# Patient Record
Sex: MALE | Race: WHITE | URBAN - METROPOLITAN AREA
[De-identification: names, ages, dates, MRNs, and addresses within clinical notes are randomized per-mention and may not be internally consistent; named-entity substitution may affect disease eponyms.]

---

## 2017-08-23 ENCOUNTER — OFFICE VISIT (OUTPATIENT)
Dept: INTERNAL MEDICINE CLINIC | Age: 62
End: 2017-08-23

## 2017-08-23 VITALS
WEIGHT: 198 LBS | HEIGHT: 69 IN | DIASTOLIC BLOOD PRESSURE: 80 MMHG | BODY MASS INDEX: 29.33 KG/M2 | RESPIRATION RATE: 16 BRPM | TEMPERATURE: 98.6 F | SYSTOLIC BLOOD PRESSURE: 124 MMHG | OXYGEN SATURATION: 96 % | HEART RATE: 74 BPM

## 2017-08-23 DIAGNOSIS — Z00.00 ROUTINE GENERAL MEDICAL EXAMINATION AT A HEALTH CARE FACILITY: Primary | ICD-10-CM

## 2017-08-23 DIAGNOSIS — Z11.59 NEED FOR HEPATITIS C SCREENING TEST: ICD-10-CM

## 2017-08-23 DIAGNOSIS — I10 ESSENTIAL HYPERTENSION WITH GOAL BLOOD PRESSURE LESS THAN 130/80: ICD-10-CM

## 2017-08-23 DIAGNOSIS — E78.2 MIXED HYPERLIPIDEMIA: ICD-10-CM

## 2017-08-23 RX ORDER — LOVASTATIN 20 MG/1
TABLET ORAL
Qty: 90 TAB | Refills: 4 | Status: SHIPPED | OUTPATIENT
Start: 2017-08-23 | End: 2018-11-29 | Stop reason: SDUPTHER

## 2017-08-23 RX ORDER — LISINOPRIL 10 MG/1
10 TABLET ORAL DAILY
Qty: 90 TAB | Refills: 4 | Status: SHIPPED | OUTPATIENT
Start: 2017-08-23 | End: 2018-11-29 | Stop reason: SDUPTHER

## 2017-08-23 RX ORDER — SILDENAFIL CITRATE 20 MG/1
20-100 TABLET ORAL
Qty: 90 TAB | Refills: 3 | Status: SHIPPED | OUTPATIENT
Start: 2017-08-23 | End: 2022-04-18 | Stop reason: ALTCHOICE

## 2017-08-23 NOTE — MR AVS SNAPSHOT
Visit Information Date & Time Provider Department Dept. Phone Encounter #  
 8/23/2017 10:30 AM Beatriz Torres MD [facility] Internal Medicine [phone] [id number] Follow-up Instructions Return in about 1 year (around 8/23/2018). Upcoming Health Maintenance Date Due Hepatitis C Screening 1955 INFLUENZA AGE 9 TO ADULT 8/1/2017 COLONOSCOPY 10/1/2018 DTaP/Tdap/Td series (2 - Td) 8/19/2026 Allergies as of 8/23/2017  Review Complete On: 8/23/2017 By: Beatriz Torres MD  
 No Known Allergies Current Immunizations  Reviewed on 8/23/2017 Name Date Influenza Vaccine 11/1/2013 Influenza Vaccine (Quad) PF 9/28/2015 Influenza Vaccine Whole 10/7/2011 Tdap 8/19/2016 Reviewed by Beatriz Torres MD on 8/23/2017 at 11:17 AM  
You Were Diagnosed With   
  
 Codes Comments Routine general medical examination at a health care facility    -  Primary ICD-10-CM: Z00.00 ICD-9-CM: V70.0 Need for hepatitis C screening test     ICD-10-CM: Z11.59 
ICD-9-CM: V73.89 Mixed hyperlipidemia     ICD-10-CM: E78.2 ICD-9-CM: 272.2 Essential hypertension with goal blood pressure less than 130/80     ICD-10-CM: I10 
ICD-9-CM: 401.9 Vitals BP Pulse Temp Resp Height(growth percentile) Weight(growth percentile) 124/80 74 98.6 °F (37 °C) (Oral) 16 5' 9.25\" (1.759 m) 198 lb (89.8 kg) SpO2 BMI Smoking Status 96% 29.03 kg/m2 Never Smoker Vitals History BMI and BSA Data Body Mass Index Body Surface Area  
 29.03 kg/m 2 2.09 m 2 Preferred Pharmacy Pharmacy Name Phone [address] [phone] Your Updated Medication List  
  
   
This list is accurate as of: 8/23/17 11:24 AM.  Always use your most recent med list.  
  
  
  
  
 aspirin 81 mg tablet Take  by mouth.  
  
 lisinopril 10 mg tablet Commonly known as:  Beatrice Keller  
 Take 1 Tab by mouth daily. lovastatin 20 mg tablet Commonly known as:  MEVACOR  
TAKE ONE TABLET BY MOUTH NIGHTLY AT BEDTIME  
  
 * sildenafil citrate 100 mg tablet Commonly known as:  VIAGRA Take 1 Tab by mouth as needed. * sildenafil 20 mg tablet Commonly known as:  REVATIO Take 1-5 Tabs by mouth daily as needed. SOOLANTRA 1 % Crea Generic drug:  ivermectin  
every other day. * Notice: This list has 2 medication(s) that are the same as other medications prescribed for you. Read the directions carefully, and ask your doctor or other care provider to review them with you. Prescriptions Printed Refills  
 lisinopril (PRINIVIL, ZESTRIL) 10 mg tablet 4 Sig: Take 1 Tab by mouth daily. Class: Print Route: Oral  
 lovastatin (MEVACOR) 20 mg tablet 4 Sig: TAKE ONE TABLET BY MOUTH NIGHTLY AT BEDTIME Class: Print  
 sildenafil (REVATIO) 20 mg tablet 3 Sig: Take 1-5 Tabs by mouth daily as needed. Class: Print Route: Oral  
  
We Performed the Following CBC WITH AUTOMATED DIFF [33696 CPT(R)] HEPATITIS C AB [73762 CPT(R)] LIPID PANEL [28285 CPT(R)] METABOLIC PANEL, COMPREHENSIVE [32934 CPT(R)] PSA, DIAGNOSTIC (PROSTATE SPECIFIC AG) [id number] CPT(R)] TSH RFX ON ABNORMAL TO FREE T4 [[id number] Custom] UA/M W/RFLX CULTURE, ROUTINE [[id number] Custom] Follow-up Instructions Return in about 1 year (around 8/23/2018). Introducing [facility]! Dear [facility]: Thank you for requesting a [facility] account. Our records indicate that you already have an active [facility] account. You can access your account anytime at [email or web address] Did you know that you can access your hospital and ER discharge instructions at any time in [facility]? You can also review all of your test results from your hospital stay or ER visit. Additional Information If you have questions, please visit the Frequently Asked Questions section of the [employer] website at [email or web address] Remember, [facility] is NOT to be used for urgent needs. For medical emergencies, dial 911. Now available from your iPhone and Android! Please provide this summary of care documentation to your next provider. Your primary care clinician is listed as Bret [id number] If you have any questions after today's visit, please call [phone].

## 2017-08-24 LAB
ALBUMIN SERPL-MCNC: 4.9 G/DL (ref 3.5–5.5)
ALBUMIN/GLOB SERPL: 2.1 {RATIO} (ref 1.2–2.2)
ALP SERPL-CCNC: 42 IU/L (ref 39–117)
ALT SERPL-CCNC: 26 IU/L (ref 0–44)
APPEARANCE UR: CLEAR
AST SERPL-CCNC: 19 IU/L (ref 0–40)
BACTERIA #/AREA URNS HPF: NORMAL /[HPF]
BASOPHILS # BLD AUTO: 0 X10E3/UL (ref 0–0.2)
BASOPHILS NFR BLD AUTO: 0 %
BILIRUB SERPL-MCNC: 0.9 MG/DL (ref 0–1.2)
BILIRUB UR QL STRIP: NEGATIVE
BUN SERPL-MCNC: 15 MG/DL (ref 6–24)
BUN/CREAT SERPL: 15 (ref 9–20)
CALCIUM SERPL-MCNC: 9.4 MG/DL (ref 8.7–10.2)
CASTS URNS QL MICRO: NORMAL /LPF
CHLORIDE SERPL-SCNC: 99 MMOL/L (ref 96–106)
CHOLEST SERPL-MCNC: 143 MG/DL (ref 100–199)
CO2 SERPL-SCNC: 23 MMOL/L (ref 18–29)
COLOR UR: YELLOW
CREAT SERPL-MCNC: 0.99 MG/DL (ref 0.76–1.27)
EOSINOPHIL # BLD AUTO: 0.1 X10E3/UL (ref 0–0.4)
EOSINOPHIL NFR BLD AUTO: 1 %
EPI CELLS #/AREA URNS HPF: NORMAL /HPF
ERYTHROCYTE [DISTWIDTH] IN BLOOD BY AUTOMATED COUNT: 13.9 % (ref 12.3–15.4)
GLOBULIN SER CALC-MCNC: 2.3 G/DL (ref 1.5–4.5)
GLUCOSE SERPL-MCNC: 97 MG/DL (ref 65–99)
GLUCOSE UR QL: NEGATIVE
HCT VFR BLD AUTO: 45 % (ref 37.5–51)
HCV AB S/CO SERPL IA: <0.1 S/CO RATIO (ref 0–0.9)
HDLC SERPL-MCNC: 41 MG/DL
HGB BLD-MCNC: 15.5 G/DL (ref 12.6–17.7)
HGB UR QL STRIP: NEGATIVE
IMM GRANULOCYTES # BLD: 0 X10E3/UL (ref 0–0.1)
IMM GRANULOCYTES NFR BLD: 0 %
KETONES UR QL STRIP: NEGATIVE
LDLC SERPL CALC-MCNC: 79 MG/DL (ref 0–99)
LEUKOCYTE ESTERASE UR QL STRIP: NEGATIVE
LYMPHOCYTES # BLD AUTO: 2.2 X10E3/UL (ref 0.7–3.1)
LYMPHOCYTES NFR BLD AUTO: 31 %
MCH RBC QN AUTO: 30.3 PG (ref 26.6–33)
MCHC RBC AUTO-ENTMCNC: 34.4 G/DL (ref 31.5–35.7)
MCV RBC AUTO: 88 FL (ref 79–97)
MICRO URNS: NORMAL
MICRO URNS: NORMAL
MONOCYTES # BLD AUTO: 0.6 X10E3/UL (ref 0.1–0.9)
MONOCYTES NFR BLD AUTO: 8 %
MUCOUS THREADS URNS QL MICRO: PRESENT
NEUTROPHILS # BLD AUTO: 4.2 X10E3/UL (ref 1.4–7)
NEUTROPHILS NFR BLD AUTO: 60 %
NITRITE UR QL STRIP: NEGATIVE
PH UR STRIP: 6.5 [PH] (ref 5–7.5)
PLATELET # BLD AUTO: 233 X10E3/UL (ref 150–379)
POTASSIUM SERPL-SCNC: 4.3 MMOL/L (ref 3.5–5.2)
PROT SERPL-MCNC: 7.2 G/DL (ref 6–8.5)
PROT UR QL STRIP: NEGATIVE
PSA SERPL-MCNC: 3.8 NG/ML (ref 0–4)
RBC # BLD AUTO: 5.11 X10E6/UL (ref 4.14–5.8)
RBC #/AREA URNS HPF: NORMAL /HPF
SODIUM SERPL-SCNC: 139 MMOL/L (ref 134–144)
SP GR UR: 1.01 (ref 1–1.03)
TRIGL SERPL-MCNC: 114 MG/DL (ref 0–149)
TSH SERPL DL<=0.005 MIU/L-ACNC: 1.93 UIU/ML (ref 0.45–4.5)
URINALYSIS REFLEX, 377202: NORMAL
UROBILINOGEN UR STRIP-MCNC: 0.2 MG/DL (ref 0.2–1)
VLDLC SERPL CALC-MCNC: 23 MG/DL (ref 5–40)
WBC # BLD AUTO: 7.1 X10E3/UL (ref 3.4–10.8)
WBC #/AREA URNS HPF: NORMAL /HPF

## 2017-08-24 NOTE — PROGRESS NOTES
Subjective:     Nicki Gavin is a 63 y.o. male presenting for annual exam and complete physical.    Patient Active Problem List    Diagnosis Date Noted    Advance directive declined by patient 08/19/2016    Basal cell carcinoma of chest wall     Adenomatous colon polyp     Essential hypertension with goal blood pressure less than 130/80 10/24/2011    Mixed hyperlipidemia 07/21/2010     Current Outpatient Prescriptions   Medication Sig Dispense Refill    lisinopril (PRINIVIL, ZESTRIL) 10 mg tablet Take 1 Tab by mouth daily. 90 Tab 4    lovastatin (MEVACOR) 20 mg tablet TAKE ONE TABLET BY MOUTH NIGHTLY AT BEDTIME 90 Tab 4    sildenafil (REVATIO) 20 mg tablet Take 1-5 Tabs by mouth daily as needed. 90 Tab 3    SOOLANTRA 1 % crea every other day.  sildenafil citrate (VIAGRA) 100 mg tablet Take 1 Tab by mouth as needed. 4 Tab prn    aspirin 81 mg tablet Take  by mouth. No Known Allergies  Past Medical History:   Diagnosis Date    Adenomatous colon polyp     Basal cell carcinoma of chest wall     Hypercholesterolemia     Hypertension     Mixed hyperlipidemia 7/21/2010    S/P tonsillectomy      Past Surgical History:   Procedure Laterality Date    ENDOSCOPY, COLON, DIAGNOSTIC  3/21/2008    HX COLONOSCOPY  10/1/13    HX TONSILLECTOMY       Family History   Problem Relation Age of Onset    Lung Disease Mother     Diabetes Father     Heart Disease Father     Cancer Father      Bladder    Hypertension Father     Cancer Brother      Pancreatic, islet cell    Asthma Daughter     Cancer Daughter      Lymphoma    Crohn's Disease Daughter      Social History   Substance Use Topics    Smoking status: Never Smoker    Smokeless tobacco: Never Used    Alcohol use 1.0 oz/week     2 Cans of beer per week             Review of Systems  A comprehensive review of systems was negative.     Objective:     Visit Vitals    /80    Pulse 74    Temp 98.6 °F (37 °C) (Oral)    Resp 16    Ht 5' 9.25\" (1.759 m)    Wt 198 lb (89.8 kg)    SpO2 96%    BMI 29.03 kg/m2     Physical exam:   General appearance - alert, well appearing, and in no distress  Eyes - pupils equal and reactive, extraocular eye movements intact  Ears - bilateral TM's and external ear canals normal  Nose - normal and patent, no erythema, discharge or polyps  Mouth - mucous membranes moist, pharynx normal without lesions  Neck - supple, no significant adenopathy  Lymphatics - no palpable lymphadenopathy, no hepatosplenomegaly  Chest - clear to auscultation, no wheezes, rales or rhonchi, symmetric air entry  Heart - normal rate, regular rhythm, normal S1, S2, no murmurs, rubs, clicks or gallops  Abdomen - soft, nontender, nondistended, no masses or organomegaly  Rectal - deferred, not clinically indicated  Back exam - full range of motion, no tenderness, palpable spasm or pain on motion  Neurological - alert, oriented, normal speech, no focal findings or movement disorder noted  Musculoskeletal - no joint tenderness, deformity or swelling  Extremities - peripheral pulses normal, no pedal edema, no clubbing or cyanosis     Assessment/Plan:       lose weight, increase physical activity, bring BP log to office visit, follow low fat diet, follow low salt diet, routine labs ordered. Diagnoses and all orders for this visit:    1. Routine general medical examination at a health care facility  -     CBC WITH AUTOMATED DIFF  -     METABOLIC PANEL, COMPREHENSIVE  -     LIPID PANEL  -     TSH RFX ON ABNORMAL TO FREE T4  -     UA/M W/RFLX CULTURE, ROUTINE  -     HEPATITIS C AB  -     lisinopril (PRINIVIL, ZESTRIL) 10 mg tablet; Take 1 Tab by mouth daily. -     lovastatin (MEVACOR) 20 mg tablet; TAKE ONE TABLET BY MOUTH NIGHTLY AT BEDTIME  -     PROSTATE SPECIFIC AG  -     sildenafil (REVATIO) 20 mg tablet; Take 1-5 Tabs by mouth daily as needed. 2. Need for hepatitis C screening test    3. Mixed hyperlipidemia - check for stability.  LDL goal of 100 and tolerating meds well. 4. Essential hypertension with goal blood pressure less than 130/80 - BP well controlled. Will check levels let me know if high. Follow-up Disposition:  Return in about 1 year (around 8/23/2018). Advised him to call back or return to office if symptoms worsen/change/persist.  Discussed expected course/resolution/complications of diagnosis in detail with patient. Medication risks/benefits/costs/interactions/alternatives discussed with patient. He was given an after visit summary which includes diagnoses, current medications, & vitals. He expressed understanding with the diagnosis and plan. Nikolas Jackson

## 2017-10-31 RX ORDER — LISINOPRIL 10 MG/1
TABLET ORAL
Qty: 90 TAB | Refills: 4 | Status: SHIPPED | OUTPATIENT
Start: 2017-10-31 | End: 2018-11-29 | Stop reason: SDUPTHER

## 2017-10-31 RX ORDER — LOVASTATIN 20 MG/1
TABLET ORAL
Qty: 90 TAB | Refills: 3 | Status: SHIPPED | OUTPATIENT
Start: 2017-10-31 | End: 2018-10-21 | Stop reason: SDUPTHER

## 2017-11-28 DIAGNOSIS — R97.20 INCREASED PROSTATE SPECIFIC ANTIGEN (PSA) VELOCITY: Primary | ICD-10-CM

## 2017-12-02 LAB
PSA SERPL-MCNC: 1.8 NG/ML (ref 0–4)
REFLEX CRITERIA: NORMAL

## 2018-10-21 RX ORDER — LOVASTATIN 20 MG/1
TABLET ORAL
Qty: 90 TAB | Refills: 3 | Status: SHIPPED | OUTPATIENT
Start: 2018-10-21 | End: 2018-11-29 | Stop reason: SDUPTHER

## 2018-11-29 ENCOUNTER — OFFICE VISIT (OUTPATIENT)
Dept: INTERNAL MEDICINE CLINIC | Age: 63
End: 2018-11-29

## 2018-11-29 VITALS
TEMPERATURE: 97.8 F | OXYGEN SATURATION: 97 % | BODY MASS INDEX: 29.77 KG/M2 | HEART RATE: 68 BPM | SYSTOLIC BLOOD PRESSURE: 136 MMHG | DIASTOLIC BLOOD PRESSURE: 84 MMHG | HEIGHT: 69 IN | WEIGHT: 201 LBS | RESPIRATION RATE: 14 BRPM

## 2018-11-29 DIAGNOSIS — I10 ESSENTIAL HYPERTENSION WITH GOAL BLOOD PRESSURE LESS THAN 130/80: ICD-10-CM

## 2018-11-29 DIAGNOSIS — E78.2 MIXED HYPERLIPIDEMIA: Primary | ICD-10-CM

## 2018-11-29 DIAGNOSIS — Z00.00 ROUTINE MEDICAL EXAM: ICD-10-CM

## 2018-11-29 DIAGNOSIS — Z12.5 PROSTATE CANCER SCREENING: ICD-10-CM

## 2018-11-29 DIAGNOSIS — Z00.00 ROUTINE GENERAL MEDICAL EXAMINATION AT A HEALTH CARE FACILITY: ICD-10-CM

## 2018-11-29 RX ORDER — LISINOPRIL 10 MG/1
10 TABLET ORAL DAILY
Qty: 90 TAB | Refills: 4 | Status: SHIPPED | OUTPATIENT
Start: 2018-11-29 | End: 2020-01-23

## 2018-11-29 RX ORDER — LOVASTATIN 20 MG/1
TABLET ORAL
Qty: 90 TAB | Refills: 4 | Status: SHIPPED | OUTPATIENT
Start: 2018-11-29 | End: 2019-12-22

## 2018-11-30 NOTE — PROGRESS NOTES
Subjective:  
 
Nacho Vargas is a 66 y.o. male presenting for annual exam and complete physical. 
 
Patient Active Problem List  
 Diagnosis Date Noted  Advance directive declined by patient 08/19/2016  Basal cell carcinoma of chest wall  Adenomatous colon polyp  Essential hypertension with goal blood pressure less than 130/80 10/24/2011  Mixed hyperlipidemia 07/21/2010 Current Outpatient Medications Medication Sig Dispense Refill  varicella-zoster recombinant, PF, (SHINGRIX, PF,) 50 mcg/0.5 mL susr injection 0.5 mL by IntraMUSCular route once for 1 dose. 0.5 mL 1  
 lovastatin (MEVACOR) 20 mg tablet TAKE ONE TABLET BY MOUTH NIGHTLY AT BEDTIME 90 Tab 4  
 lisinopril (PRINIVIL, ZESTRIL) 10 mg tablet Take 1 Tab by mouth daily. 90 Tab 4  
 SOOLANTRA 1 % crea every other day.  aspirin 81 mg tablet Take  by mouth.  sildenafil (REVATIO) 20 mg tablet Take 1-5 Tabs by mouth daily as needed. 90 Tab 3  
 sildenafil citrate (VIAGRA) 100 mg tablet Take 1 Tab by mouth as needed. 4 Tab prn No Known Allergies Past Medical History:  
Diagnosis Date  Adenomatous colon polyp  Basal cell carcinoma of chest wall  Hypercholesterolemia  Hypertension  Mixed hyperlipidemia 7/21/2010  S/P tonsillectomy Past Surgical History:  
Procedure Laterality Date  ENDOSCOPY, COLON, DIAGNOSTIC  3/21/2008  HX COLONOSCOPY  10/1/13  HX TONSILLECTOMY Family History Problem Relation Age of Onset  Lung Disease Mother  Diabetes Father  Heart Disease Father  Cancer Father Bladder  Hypertension Father  Cancer Brother Pancreatic, islet cell  Asthma Daughter  Cancer Daughter Lymphoma  Crohn's Disease Daughter Social History Tobacco Use  Smoking status: Never Smoker  Smokeless tobacco: Never Used Substance Use Topics  Alcohol use: Yes Alcohol/week: 1.0 oz Types: 2 Cans of beer per week Frequency: Monthly or less Drinks per session: 1 or 2 Binge frequency: Never Lab Results Component Value Date/Time WBC 7.1 08/23/2017 11:52 AM  
 HGB 15.5 08/23/2017 11:52 AM  
 HCT 45.0 08/23/2017 11:52 AM  
 PLATELET 729 74/35/3133 11:52 AM  
 MCV 88 08/23/2017 11:52 AM  
 
Lab Results Component Value Date/Time Cholesterol, total 143 08/23/2017 11:52 AM  
 HDL Cholesterol 41 08/23/2017 11:52 AM  
 LDL, calculated 79 08/23/2017 11:52 AM  
 LDL-C, External 80.4 06/06/2016 Triglyceride 114 08/23/2017 11:52 AM  
 
Lab Results Component Value Date/Time ALT (SGPT) 26 08/23/2017 11:52 AM  
 AST (SGOT) 19 08/23/2017 11:52 AM  
 Alk. phosphatase 42 08/23/2017 11:52 AM  
 Bilirubin, total 0.9 08/23/2017 11:52 AM  
 Albumin 4.9 08/23/2017 11:52 AM  
 Protein, total 7.2 08/23/2017 11:52 AM  
 PLATELET 926 53/19/3212 11:52 AM  
 
Lab Results Component Value Date/Time GFR est non-AA 83 08/23/2017 11:52 AM  
 GFR est AA 96 08/23/2017 11:52 AM  
 Creatinine 0.99 08/23/2017 11:52 AM  
 BUN 15 08/23/2017 11:52 AM  
 Sodium 139 08/23/2017 11:52 AM  
 Potassium 4.3 08/23/2017 11:52 AM  
 Chloride 99 08/23/2017 11:52 AM  
 CO2 23 08/23/2017 11:52 AM  
 
Lab Results Component Value Date/Time  
 Prostate Specific Ag 1.8 12/01/2017 10:55 AM  
 Prostate Specific Ag 3.8 08/23/2017 11:52 AM  
 
Lab Results Component Value Date/Time TSH 1.930 08/23/2017 11:52 AM  
  
Lab Results Component Value Date/Time Glucose 97 08/23/2017 11:52 AM  
   
 
Review of Systems A comprehensive review of systems was negative. Objective:  
 
Visit Vitals /84 Pulse 68 Temp 97.8 °F (36.6 °C) (Oral) Resp 14 Ht 5' 9.25\" (1.759 m) Wt 201 lb (91.2 kg) SpO2 97% BMI 29.47 kg/m² Physical exam:  
General appearance - alert, well appearing, and in no distress Eyes - pupils equal and reactive, extraocular eye movements intact Ears - bilateral TM's and external ear canals normal 
 Nose - normal and patent, no erythema, discharge or polyps Mouth - mucous membranes moist, pharynx normal without lesions Neck - supple, no significant adenopathy Lymphatics - no palpable lymphadenopathy, no hepatosplenomegaly Chest - clear to auscultation, no wheezes, rales or rhonchi, symmetric air entry Heart - normal rate, regular rhythm, normal S1, S2, no murmurs, rubs, clicks or gallops Abdomen - soft, nontender, nondistended, no masses or organomegaly Neurological - alert, oriented, normal speech, no focal findings or movement disorder noted Musculoskeletal - no joint tenderness, deformity or swelling Extremities - peripheral pulses normal, no pedal edema, no clubbing or cyanosis Assessment/Plan:  
 
 
lose weight, increase physical activity, bring BP log to office visit, follow low fat diet, follow low salt diet, routine labs ordered. Diagnoses and all orders for this visit: 
 
1. Mixed hyperlipidemia -LDL goal of 100. Tolerating statin well. Will continue that for now. 2. Routine general medical examination at a health care facility 
-     CBC WITH AUTOMATED DIFF 
-     METABOLIC PANEL, COMPREHENSIVE 
-     LIPID PANEL 
-     TSH RFX ON ABNORMAL TO FREE T4 
-     PSA, DIAGNOSTIC (PROSTATE SPECIFIC AG) -     lovastatin (MEVACOR) 20 mg tablet; TAKE ONE TABLET BY MOUTH NIGHTLY AT BEDTIME 
-     lisinopril (PRINIVIL, ZESTRIL) 10 mg tablet; Take 1 Tab by mouth daily. 3. Essential hypertension with goal blood pressure less than 130/80 -blood pressure reasonably controlled. Continue to monitor readings. 4. Routine medical exam 
 
5. Prostate cancer screening Other orders 
-     varicella-zoster recombinant, PF, (SHINGRIX, PF,) 50 mcg/0.5 mL susr injection; 0.5 mL by IntraMUSCular route once for 1 dose. Follow-up Disposition: 
Return in about 1 year (around 11/29/2019) for CPE.   
Advised him to call back or return to office if symptoms worsen/change/persist. 
 Discussed expected course/resolution/complications of diagnosis in detail with patient. Medication risks/benefits/costs/interactions/alternatives discussed with patient. He was given an after visit summary which includes diagnoses, current medications, & vitals. He expressed understanding with the diagnosis and plan. Ya Andrew

## 2018-12-01 LAB
ALBUMIN SERPL-MCNC: 4.6 G/DL (ref 3.6–4.8)
ALBUMIN/GLOB SERPL: 2.1 {RATIO} (ref 1.2–2.2)
ALP SERPL-CCNC: 37 IU/L (ref 39–117)
ALT SERPL-CCNC: 33 IU/L (ref 0–44)
AST SERPL-CCNC: 25 IU/L (ref 0–40)
BASOPHILS # BLD AUTO: 0 X10E3/UL (ref 0–0.2)
BASOPHILS NFR BLD AUTO: 0 %
BILIRUB SERPL-MCNC: 0.5 MG/DL (ref 0–1.2)
BUN SERPL-MCNC: 20 MG/DL (ref 8–27)
BUN/CREAT SERPL: 18 (ref 10–24)
CALCIUM SERPL-MCNC: 9.2 MG/DL (ref 8.6–10.2)
CHLORIDE SERPL-SCNC: 104 MMOL/L (ref 96–106)
CHOLEST SERPL-MCNC: 148 MG/DL (ref 100–199)
CO2 SERPL-SCNC: 24 MMOL/L (ref 20–29)
CREAT SERPL-MCNC: 1.14 MG/DL (ref 0.76–1.27)
EOSINOPHIL # BLD AUTO: 0.1 X10E3/UL (ref 0–0.4)
EOSINOPHIL NFR BLD AUTO: 1 %
ERYTHROCYTE [DISTWIDTH] IN BLOOD BY AUTOMATED COUNT: 14 % (ref 12.3–15.4)
GLOBULIN SER CALC-MCNC: 2.2 G/DL (ref 1.5–4.5)
GLUCOSE SERPL-MCNC: 103 MG/DL (ref 65–99)
HCT VFR BLD AUTO: 44.3 % (ref 37.5–51)
HDLC SERPL-MCNC: 41 MG/DL
HGB BLD-MCNC: 15.5 G/DL (ref 13–17.7)
IMM GRANULOCYTES # BLD: 0 X10E3/UL (ref 0–0.1)
IMM GRANULOCYTES NFR BLD: 0 %
LDLC SERPL CALC-MCNC: 91 MG/DL (ref 0–99)
LYMPHOCYTES # BLD AUTO: 2.1 X10E3/UL (ref 0.7–3.1)
LYMPHOCYTES NFR BLD AUTO: 34 %
MCH RBC QN AUTO: 30.3 PG (ref 26.6–33)
MCHC RBC AUTO-ENTMCNC: 35 G/DL (ref 31.5–35.7)
MCV RBC AUTO: 87 FL (ref 79–97)
MONOCYTES # BLD AUTO: 0.6 X10E3/UL (ref 0.1–0.9)
MONOCYTES NFR BLD AUTO: 10 %
NEUTROPHILS # BLD AUTO: 3.4 X10E3/UL (ref 1.4–7)
NEUTROPHILS NFR BLD AUTO: 55 %
PLATELET # BLD AUTO: 222 X10E3/UL (ref 150–379)
POTASSIUM SERPL-SCNC: 4.8 MMOL/L (ref 3.5–5.2)
PROT SERPL-MCNC: 6.8 G/DL (ref 6–8.5)
PSA SERPL-MCNC: 1.4 NG/ML (ref 0–4)
RBC # BLD AUTO: 5.12 X10E6/UL (ref 4.14–5.8)
SODIUM SERPL-SCNC: 143 MMOL/L (ref 134–144)
TRIGL SERPL-MCNC: 82 MG/DL (ref 0–149)
TSH SERPL DL<=0.005 MIU/L-ACNC: 1.6 UIU/ML (ref 0.45–4.5)
VLDLC SERPL CALC-MCNC: 16 MG/DL (ref 5–40)
WBC # BLD AUTO: 6.2 X10E3/UL (ref 3.4–10.8)

## 2019-12-22 DIAGNOSIS — Z00.00 ROUTINE GENERAL MEDICAL EXAMINATION AT A HEALTH CARE FACILITY: ICD-10-CM

## 2019-12-22 RX ORDER — LOVASTATIN 20 MG/1
TABLET ORAL
Qty: 90 TAB | Refills: 0 | Status: SHIPPED | OUTPATIENT
Start: 2019-12-22 | End: 2020-04-28 | Stop reason: SDUPTHER

## 2020-01-23 DIAGNOSIS — Z00.00 ROUTINE GENERAL MEDICAL EXAMINATION AT A HEALTH CARE FACILITY: ICD-10-CM

## 2020-01-23 RX ORDER — LISINOPRIL 10 MG/1
TABLET ORAL
Qty: 90 TAB | Refills: 4 | Status: SHIPPED | OUTPATIENT
Start: 2020-01-23 | End: 2021-02-26

## 2020-04-28 DIAGNOSIS — Z00.00 ROUTINE GENERAL MEDICAL EXAMINATION AT A HEALTH CARE FACILITY: ICD-10-CM

## 2020-04-28 RX ORDER — LOVASTATIN 20 MG/1
20 TABLET ORAL
Qty: 30 TAB | Refills: 0 | Status: SHIPPED | OUTPATIENT
Start: 2020-04-28 | End: 2020-05-12 | Stop reason: SDUPTHER

## 2020-04-28 NOTE — TELEPHONE ENCOUNTER
Orders Placed This Encounter    lovastatin (MEVACOR) 20 mg tablet     Sig: Take 1 Tab by mouth nightly. Dispense:  30 Tab     Refill:  0     The above orders were approved via [facility] per Dr. Gibson Kebede, III.

## 2020-04-28 NOTE — TELEPHONE ENCOUNTER
Requested Prescriptions     Pending Prescriptions Disp Refills    lovastatin (MEVACOR) 20 mg tablet 90 Tab 0

## 2020-05-12 ENCOUNTER — TELEPHONE (OUTPATIENT)
Dept: INTERNAL MEDICINE CLINIC | Age: 65
End: 2020-05-12

## 2020-05-12 DIAGNOSIS — Z00.00 ROUTINE GENERAL MEDICAL EXAMINATION AT A HEALTH CARE FACILITY: ICD-10-CM

## 2020-05-12 RX ORDER — LOVASTATIN 20 MG/1
20 TABLET ORAL
Qty: 30 TAB | Refills: 0 | Status: SHIPPED | OUTPATIENT
Start: 2020-05-12 | End: 2020-06-08 | Stop reason: SDUPTHER

## 2020-05-12 NOTE — TELEPHONE ENCOUNTER
Pt said that Pharmacy never received the Lovstein    Can this be recalled in.  This was done on 04/28/20      [id number] [place]. AT [address] [place] [address]  [phone]

## 2020-06-08 ENCOUNTER — TELEPHONE (OUTPATIENT)
Dept: INTERNAL MEDICINE CLINIC | Age: 65
End: 2020-06-08

## 2020-06-08 DIAGNOSIS — E78.2 MIXED HYPERLIPIDEMIA: ICD-10-CM

## 2020-06-08 DIAGNOSIS — I10 ESSENTIAL HYPERTENSION WITH GOAL BLOOD PRESSURE LESS THAN 130/80: Primary | ICD-10-CM

## 2020-06-08 DIAGNOSIS — Z00.00 ROUTINE GENERAL MEDICAL EXAMINATION AT A HEALTH CARE FACILITY: ICD-10-CM

## 2020-06-08 DIAGNOSIS — Z12.5 PROSTATE CANCER SCREENING: ICD-10-CM

## 2020-06-08 RX ORDER — LOVASTATIN 20 MG/1
20 TABLET ORAL
Qty: 30 TAB | Refills: 0 | Status: SHIPPED | OUTPATIENT
Start: 2020-06-08 | End: 2020-06-25 | Stop reason: SDUPTHER

## 2020-06-18 LAB
ALBUMIN SERPL-MCNC: 4.8 G/DL (ref 3.8–4.8)
ALBUMIN/GLOB SERPL: 2 {RATIO} (ref 1.2–2.2)
ALP SERPL-CCNC: 37 IU/L (ref 39–117)
ALT SERPL-CCNC: 27 IU/L (ref 0–44)
AST SERPL-CCNC: 19 IU/L (ref 0–40)
BASOPHILS # BLD AUTO: 0 X10E3/UL (ref 0–0.2)
BASOPHILS NFR BLD AUTO: 0 %
BILIRUB SERPL-MCNC: 0.4 MG/DL (ref 0–1.2)
BUN SERPL-MCNC: 16 MG/DL (ref 8–27)
BUN/CREAT SERPL: 14 (ref 10–24)
CALCIUM SERPL-MCNC: 9.3 MG/DL (ref 8.6–10.2)
CHLORIDE SERPL-SCNC: 105 MMOL/L (ref 96–106)
CHOLEST SERPL-MCNC: 137 MG/DL (ref 100–199)
CO2 SERPL-SCNC: 22 MMOL/L (ref 20–29)
CREAT SERPL-MCNC: 1.11 MG/DL (ref 0.76–1.27)
EOSINOPHIL # BLD AUTO: 0.1 X10E3/UL (ref 0–0.4)
EOSINOPHIL NFR BLD AUTO: 1 %
ERYTHROCYTE [DISTWIDTH] IN BLOOD BY AUTOMATED COUNT: 13.3 % (ref 11.6–15.4)
GLOBULIN SER CALC-MCNC: 2.4 G/DL (ref 1.5–4.5)
GLUCOSE SERPL-MCNC: 114 MG/DL (ref 65–99)
HCT VFR BLD AUTO: 45.8 % (ref 37.5–51)
HDLC SERPL-MCNC: 44 MG/DL
HGB BLD-MCNC: 15.7 G/DL (ref 13–17.7)
IMM GRANULOCYTES # BLD AUTO: 0 X10E3/UL (ref 0–0.1)
IMM GRANULOCYTES NFR BLD AUTO: 0 %
LDLC SERPL CALC-MCNC: 76 MG/DL (ref 0–99)
LYMPHOCYTES # BLD AUTO: 2 X10E3/UL (ref 0.7–3.1)
LYMPHOCYTES NFR BLD AUTO: 26 %
MCH RBC QN AUTO: 30.8 PG (ref 26.6–33)
MCHC RBC AUTO-ENTMCNC: 34.3 G/DL (ref 31.5–35.7)
MCV RBC AUTO: 90 FL (ref 79–97)
MONOCYTES # BLD AUTO: 0.7 X10E3/UL (ref 0.1–0.9)
MONOCYTES NFR BLD AUTO: 9 %
NEUTROPHILS # BLD AUTO: 5 X10E3/UL (ref 1.4–7)
NEUTROPHILS NFR BLD AUTO: 64 %
PLATELET # BLD AUTO: 221 X10E3/UL (ref 150–450)
POTASSIUM SERPL-SCNC: 4.7 MMOL/L (ref 3.5–5.2)
PROT SERPL-MCNC: 7.2 G/DL (ref 6–8.5)
PSA SERPL-MCNC: 1.2 NG/ML (ref 0–4)
RBC # BLD AUTO: 5.1 X10E6/UL (ref 4.14–5.8)
REFLEX CRITERIA: NORMAL
SODIUM SERPL-SCNC: 141 MMOL/L (ref 134–144)
TRIGL SERPL-MCNC: 85 MG/DL (ref 0–149)
TSH SERPL DL<=0.005 MIU/L-ACNC: 1.84 UIU/ML (ref 0.45–4.5)
VLDLC SERPL CALC-MCNC: 17 MG/DL (ref 5–40)
WBC # BLD AUTO: 7.9 X10E3/UL (ref 3.4–10.8)

## 2020-06-25 ENCOUNTER — VIRTUAL VISIT (OUTPATIENT)
Dept: INTERNAL MEDICINE CLINIC | Age: 65
End: 2020-06-25

## 2020-06-25 DIAGNOSIS — Z00.00 ROUTINE GENERAL MEDICAL EXAMINATION AT A HEALTH CARE FACILITY: ICD-10-CM

## 2020-06-25 LAB
EST. AVERAGE GLUCOSE BLD GHB EST-MCNC: 137 MG/DL
HBA1C MFR BLD: 6.4 % (ref 4.8–5.6)
SPECIMEN STATUS REPORT, ROLRST: NORMAL

## 2020-06-25 RX ORDER — LOVASTATIN 20 MG/1
20 TABLET ORAL
Qty: 90 TAB | Refills: 3 | Status: SHIPPED | OUTPATIENT
Start: 2020-06-25 | End: 2021-07-02

## 2020-06-25 NOTE — PROGRESS NOTES
Surya Pak is a 60 y.o. male who was seen by synchronous (real-time) audio-video technology on 6/25/2020. Assessment & Plan:   Diagnoses and all orders for this visit:    1. Routine general medical examination at a health care facility  -     lovastatin (MEVACOR) 20 mg tablet; Take 1 Tab by mouth nightly. Other orders  -     varicella-zoster recombinant, PF, (SHINGRIX) 50 mcg/0.5 mL susr injection; 0.5 mL by IntraMUSCular route once for 1 dose. 2. HTN - BP controlled well    3. Hyperlipidemia - well controlled with LDL less than 100.    4. Pre-diabetes - discussed labs with him and he will work on diet and exercise for weight loss and repeat labs in 6 months. Subjective:   Surya Pak was seen for Medication Evaluation      Presents for follow-up visit. He has been generally doing well. He retired about 2 months ago and has lost about sounds. He has been trying to walk more for exercise. Denies headaches. No dizziness. No nosebleeds. No chest pains or shortness of breath. No cough or wheeze. No change in bowel or bladder habits. He wondered if he was due for another colonoscopy. Prior to Admission medications    Medication Sig Start Date End Date Taking? Authorizing Provider   lovastatin (MEVACOR) 20 mg tablet Take 1 Tab by mouth nightly. 6/25/20  Yes Volodymyr Parada MD   varicella-zoster recombinant, PF, [facility]) 50 mcg/0.5 mL susr injection 0.5 mL by IntraMUSCular route once for 1 dose. 6/25/20 6/25/20 Yes Volodymyr Parada MD   lisinopril (PRINIVIL, ZESTRIL) 10 mg tablet TAKE 1 TABLET BY MOUTH EVERY DAY 1/23/20  Yes Moncho Ayoub III, MD   SOOLANTRA 1 % crea every other day. 6/15/16  Yes Provider, Historical   aspirin 81 mg tablet Take  by mouth. Yes Provider, Historical   lovastatin (MEVACOR) 20 mg tablet Take 1 Tab by mouth nightly. 6/8/20 6/25/20  Moncho Ayoub III, MD   sildenafil (REVATIO) 20 mg tablet Take 1-5 Tabs by mouth daily as needed. 8/23/17   Max Brito MD   sildenafil citrate (VIAGRA) 100 mg tablet Take 1 Tab by mouth as needed. 8/19/16   Max Brito MD       No Known Allergies      Patient Active Problem List    Diagnosis Date Noted    Advance directive declined by patient 08/19/2016    Basal cell carcinoma of chest wall     Adenomatous colon polyp     Essential hypertension with goal blood pressure less than 130/80 10/24/2011    Mixed hyperlipidemia 07/21/2010     Current Outpatient Medications   Medication Sig Dispense Refill    lovastatin (MEVACOR) 20 mg tablet Take 1 Tab by mouth nightly. 90 Tab 3    varicella-zoster recombinant, PF, (SHINGRIX) 50 mcg/0.5 mL susr injection 0.5 mL by IntraMUSCular route once for 1 dose. 0.5 mL 1    lisinopril (PRINIVIL, ZESTRIL) 10 mg tablet TAKE 1 TABLET BY MOUTH EVERY DAY 90 Tab 4    SOOLANTRA 1 % crea every other day.  aspirin 81 mg tablet Take  by mouth.  sildenafil (REVATIO) 20 mg tablet Take 1-5 Tabs by mouth daily as needed. 90 Tab 3    sildenafil citrate (VIAGRA) 100 mg tablet Take 1 Tab by mouth as needed.  4 Tab prn     No Known Allergies  Past Medical History:   Diagnosis Date    Adenomatous colon polyp     Basal cell carcinoma of chest wall     Hypercholesterolemia     Hypertension     Mixed hyperlipidemia 7/21/2010    S/P tonsillectomy      Past Surgical History:   Procedure Laterality Date    ENDOSCOPY, COLON, DIAGNOSTIC  3/21/2008    HX COLONOSCOPY  10/1/13    HX TONSILLECTOMY       Family History   Problem Relation Age of Onset    Lung Disease Mother         COPD    Diabetes Father     Heart Disease Father     Cancer Father         bladder cancer    Hypertension Father     Cancer Brother         Islet cell    Asthma Daughter     Cancer Daughter         Lymphoma    Crohn's Disease Daughter      Social History     Tobacco Use    Smoking status: Former Smoker     Packs/day: 0.00     Years: 0.00     Pack years: 0.00     Last attempt to quit: 1975     Years since quittin.7    Smokeless tobacco: Never Used   Substance Use Topics    Alcohol use: Yes     Alcohol/week: 2.0 standard drinks     Types: 2 Cans of beer per week     Frequency: Monthly or less     Drinks per session: 1 or 2     Binge frequency: Never       ROS - per HPI      Objective:     General: alert, cooperative, no distress   Mental  status: normal mood, behavior, speech, dress, motor activity, and thought processes, able to follow commands   Eyes: EOM intact, normal sclera   Mouth: not examined   Neck: no visualized mass   Resp: normal effort and no respiratory distress   Neuro: no gross deficits   Musculoskeletal: normal ROM of neck   Skin: no discoloration or lesions of concern on visible areas   Psychiatric: normal affect, no hallucinations       We discussed the expected course, resolution and complications of the diagnosis(es) in detail. Medication risks, benefits, costs, interactions, and alternatives were discussed as indicated. I advised him to contact the office if his condition worsens, changes or fails to improve as anticipated. He expressed understanding with the diagnosis(es) and plan. Dory Naranjo is a 60 y.o. male who was evaluated by a video visit encounter for concerns as above. Patient identification was verified prior to start of the visit. A caregiver was present when appropriate. Due to this being a TeleHealth encounter (During [facility]-09 public health emergency), evaluation of the following organ systems was limited: Vitals/Constitutional/EENT/Resp/CV/GI//MS/Neuro/Skin/Heme-Lymph-Imm.   Pursuant to the emergency declaration under the [facility], 1135 waiver authority and the [employer] and [employer] Act, this Virtual  Visit was conducted, with patient's (and/or legal guardian's) consent, to reduce the patient's risk of exposure to COVID-19 and provide necessary medical care. Services were provided through a synchronous discussion virtually to substitute for in-person clinic visit. I was in the office. The patient was at home.     Sandra Ford MD

## 2020-07-08 ENCOUNTER — TELEPHONE (OUTPATIENT)
Dept: INTERNAL MEDICINE CLINIC | Age: 65
End: 2020-07-08

## 2020-07-08 NOTE — TELEPHONE ENCOUNTER
Returned call to [address] [place] [address] spoke with [employer] states they did not get the corrected fax.   Given code of R73.01.

## 2021-02-26 DIAGNOSIS — Z00.00 ROUTINE GENERAL MEDICAL EXAMINATION AT A HEALTH CARE FACILITY: ICD-10-CM

## 2021-02-26 RX ORDER — LISINOPRIL 10 MG/1
TABLET ORAL
Qty: 90 TAB | Refills: 4 | Status: SHIPPED | OUTPATIENT
Start: 2021-02-26 | End: 2022-04-18 | Stop reason: SDUPTHER

## 2021-04-07 DIAGNOSIS — R73.01 FASTING HYPERGLYCEMIA: ICD-10-CM

## 2021-04-07 DIAGNOSIS — I10 ESSENTIAL HYPERTENSION WITH GOAL BLOOD PRESSURE LESS THAN 130/80: Primary | ICD-10-CM

## 2021-07-02 DIAGNOSIS — Z00.00 ROUTINE GENERAL MEDICAL EXAMINATION AT A HEALTH CARE FACILITY: ICD-10-CM

## 2021-07-02 RX ORDER — LOVASTATIN 20 MG/1
TABLET ORAL
Qty: 90 TABLET | Refills: 3 | Status: SHIPPED | OUTPATIENT
Start: 2021-07-02 | End: 2022-04-18 | Stop reason: SDUPTHER

## 2022-02-25 ENCOUNTER — TELEPHONE (OUTPATIENT)
Dept: INTERNAL MEDICINE CLINIC | Age: 67
End: 2022-02-25

## 2022-04-18 ENCOUNTER — OFFICE VISIT (OUTPATIENT)
Dept: INTERNAL MEDICINE CLINIC | Age: 67
End: 2022-04-18
Payer: COMMERCIAL

## 2022-04-18 VITALS
WEIGHT: 201 LBS | RESPIRATION RATE: 18 BRPM | OXYGEN SATURATION: 98 % | HEART RATE: 85 BPM | HEIGHT: 70 IN | DIASTOLIC BLOOD PRESSURE: 82 MMHG | SYSTOLIC BLOOD PRESSURE: 136 MMHG | TEMPERATURE: 98 F | BODY MASS INDEX: 28.77 KG/M2

## 2022-04-18 DIAGNOSIS — D12.6 ADENOMATOUS POLYP OF COLON, UNSPECIFIED PART OF COLON: ICD-10-CM

## 2022-04-18 DIAGNOSIS — Z00.00 ROUTINE GENERAL MEDICAL EXAMINATION AT A HEALTH CARE FACILITY: ICD-10-CM

## 2022-04-18 DIAGNOSIS — I10 ESSENTIAL HYPERTENSION WITH GOAL BLOOD PRESSURE LESS THAN 130/80: ICD-10-CM

## 2022-04-18 DIAGNOSIS — E78.2 MIXED HYPERLIPIDEMIA: Primary | ICD-10-CM

## 2022-04-18 PROCEDURE — 99396 PREV VISIT EST AGE 40-64: CPT | Performed by: INTERNAL MEDICINE

## 2022-04-18 RX ORDER — LOVASTATIN 20 MG/1
20 TABLET ORAL
Qty: 90 TABLET | Refills: 4 | Status: SHIPPED | OUTPATIENT
Start: 2022-04-18

## 2022-04-18 RX ORDER — LISINOPRIL 10 MG/1
10 TABLET ORAL DAILY
Qty: 90 TABLET | Refills: 4 | Status: SHIPPED | OUTPATIENT
Start: 2022-04-18

## 2022-04-18 NOTE — PROGRESS NOTES
Chief Complaint   Patient presents with   [facility] Annual Wellness Visit       1. \"Have you been to the ER, urgent care clinic since your last visit? Hospitalized since your last visit? \" No    2. \"Have you seen or consulted any other health care providers outside of the [address] [place] [address] since your last visit? \" No     3. For patients aged 39-70: Has the patient had a colonoscopy / FIT/ Cologuard?  No        Visit Vitals  BP (!) 147/88 (BP 1 Location: Left upper arm, BP Patient Position: Sitting, BP Cuff Size: Large adult)   Pulse 85   Temp 98 °F (36.7 °C) (Temporal)   Resp 18   Ht 5' 10\" (1.778 m)   Wt 201 lb (91.2 kg)   SpO2 98%   BMI 28.84 kg/m²

## 2022-04-18 NOTE — LETTER
4/18/2022 5:18 PM    Mr. Rolando Holloway [id number]          Dear Dr. Benito Saldaña,    Please fax us the most recent Colonoscopy & Pathology so that we may update the patient's records for continuity of care.      Our fax number: [phone]    Patient:   Iam Clemons  1955              Sincerely,      Valente Camilo MD

## 2022-04-18 NOTE — PROGRESS NOTES
Subjective:     Hayley Melo is a 61 y.o. male presenting for annual exam and complete physical.    Patient Active Problem List    Diagnosis Date Noted    Advance directive declined by patient 2016    Basal cell carcinoma of chest wall     Adenomatous colon polyp     Essential hypertension with goal blood pressure less than 130/80 10/24/2011    Mixed hyperlipidemia 2010     Current Outpatient Medications   Medication Sig Dispense Refill    lovastatin (MEVACOR) 20 mg tablet Take 1 Tablet by mouth nightly. 90 Tablet 4    lisinopriL (PRINIVIL, ZESTRIL) 10 mg tablet Take 1 Tablet by mouth daily. 90 Tablet 4    SOOLANTRA 1 % crea every other day.  aspirin 81 mg tablet Take  by mouth. No Known Allergies  Past Medical History:   Diagnosis Date    Adenomatous colon polyp     Basal cell carcinoma of chest wall     Elevated fasting glucose     Hypercholesterolemia     Hypertension     Mixed hyperlipidemia 2010    S/P tonsillectomy      Past Surgical History:   Procedure Laterality Date    ENDOSCOPY, COLON, DIAGNOSTIC  3/21/2008    HX COLONOSCOPY  10/1/13    HX TONSILLECTOMY       Family History   Problem Relation Age of Onset    Lung Disease Mother         COPD    Diabetes Father     Heart Disease Father     Cancer Father         bladder cancer    Hypertension Father     Cancer Brother         Islet cell    Asthma Daughter     Cancer Daughter         Lymphoma    Crohn's Disease Daughter      Social History     Tobacco Use    Smoking status: Former Smoker     Packs/day: 0.00     Years: 0.00     Pack years: 0.00     Quit date: 1975     Years since quittin.3    Smokeless tobacco: Never Used   Substance Use Topics    Alcohol use:  Yes     Alcohol/week: 2.0 standard drinks     Types: 2 Cans of beer per week        Lab Results   Component Value Date/Time    WBC 7.9 2020 08:55 AM    HGB 15.7 2020 08:55 AM    HCT 45.8 2020 08:55 AM    PLATELET 221 06/17/2020 08:55 AM    MCV 90 06/17/2020 08:55 AM     Lab Results   Component Value Date/Time    Cholesterol, total 137 06/17/2020 08:55 AM    HDL Cholesterol 44 06/17/2020 08:55 AM    LDL, calculated 76 06/17/2020 08:55 AM    LDL-C, External 80.4 06/06/2016 12:00 AM    Triglyceride 85 06/17/2020 08:55 AM     Lab Results   Component Value Date/Time    ALT (SGPT) 37 04/14/2021 09:59 AM    Alk. phosphatase 39 (L) 04/14/2021 09:59 AM    Bilirubin, total 0.9 04/14/2021 09:59 AM    Albumin 4.5 04/14/2021 09:59 AM    Protein, total 7.4 04/14/2021 09:59 AM    PLATELET 654 25/25/8413 08:55 AM     Lab Results   Component Value Date/Time    GFR est non-AA >60 04/14/2021 09:59 AM    GFR est AA >60 04/14/2021 09:59 AM    Creatinine 1.07 04/14/2021 09:59 AM    BUN 18 04/14/2021 09:59 AM    Sodium 139 04/14/2021 09:59 AM    Potassium 4.3 04/14/2021 09:59 AM    Chloride 105 04/14/2021 09:59 AM    CO2 28 04/14/2021 09:59 AM     Lab Results   Component Value Date/Time    Prostate Specific Ag 1.2 06/17/2020 08:55 AM    Prostate Specific Ag 1.4 11/30/2018 08:18 AM    Prostate Specific Ag 1.8 12/01/2017 10:55 AM     Lab Results   Component Value Date/Time    TSH 1.840 06/17/2020 08:55 AM      Lab Results   Component Value Date/Time    Glucose 98 04/14/2021 09:59 AM         Review of Systems  A comprehensive review of systems was negative except for: Musculoskeletal: positive for Some tearing. No injury. No numbness, tingling, or weakness. Review of systems is otherwise negative.     Objective:     Visit Vitals  /82   Pulse 85   Temp 98 °F (36.7 °C) (Temporal)   Resp 18   Ht 5' 10\" (1.778 m)   Wt 201 lb (91.2 kg)   SpO2 98%   BMI 28.84 kg/m²     Physical exam:   General appearance - alert, well appearing, and in no distress  Ears - bilateral TM's and external ear canals normal  Mouth - mucous membranes moist, pharynx normal without lesions  Neck - supple, no significant adenopathy  Lymphatics - no palpable lymphadenopathy, no hepatosplenomegaly  Chest - clear to auscultation, no wheezes, rales or rhonchi, symmetric air entry  Heart - normal rate, regular rhythm, normal S1, S2, no murmurs, rubs, clicks or gallops  Abdomen - soft, nontender, nondistended, no masses or organomegaly  Neurological - alert, oriented, normal speech, no focal findings or movement disorder noted  Musculoskeletal - abnormal exam of right foot with tenderness between the first and second metatarsal heads. No bony deformity or swelling. Extremities - peripheral pulses normal, no pedal edema, no clubbing or cyanosis     Assessment/Plan:       lose weight, increase physical activity, bring BP log to office visit, follow low fat diet, follow low salt diet, routine labs ordered. Diagnoses and all orders for this visit:    1. Mixed hyperlipidemiaLDL goal of 100. Tolerating statins well. -     LIPID PANEL; Future    2. Routine general medical examination at a health care facility  -     METABOLIC PANEL, COMPREHENSIVE; Future  -     CBC WITH AUTOMATED DIFF; Future  -     LIPID PANEL; Future  -     TSH 3RD GENERATION; Future  -     PSA SCREENING (SCREENING); Future  -     lovastatin (MEVACOR) 20 mg tablet; Take 1 Tablet by mouth nightly. -     lisinopriL (PRINIVIL, ZESTRIL) 10 mg tablet; Take 1 Tablet by mouth daily. 3. Essential hypertension with goal blood pressure less than 130/80blood pressure reasonably controlled. Continue current meds. I did do a risk calculator with him today and his 10-year risk was 12%. For this reason, recommended continuing his aspirin. 4. Adenomatous polyp of colon, unspecified part of colonrecent colonoscopy normal.  5-year follow-up. 5.  Foot pain? Keyes's neuroma. Will refer to podiatry. Candelario Atkins

## 2022-04-25 LAB
EST. AVERAGE GLUCOSE BLD GHB EST-MCNC: 131 MG/DL
HBA1C MFR BLD: 6.2 % (ref 4–5.6)

## 2023-04-23 DIAGNOSIS — Z00.00 ROUTINE GENERAL MEDICAL EXAMINATION AT A HEALTH CARE FACILITY: ICD-10-CM

## 2023-04-23 RX ORDER — LISINOPRIL 10 MG/1
TABLET ORAL
Qty: 90 TABLET | Refills: 4 | Status: SHIPPED | OUTPATIENT
Start: 2023-04-23

## 2023-05-02 ENCOUNTER — OFFICE VISIT (OUTPATIENT)
Dept: INTERNAL MEDICINE CLINIC | Age: 68
End: 2023-05-02
Payer: MEDICARE

## 2023-05-02 VITALS
BODY MASS INDEX: 29.18 KG/M2 | WEIGHT: 197 LBS | HEIGHT: 69 IN | DIASTOLIC BLOOD PRESSURE: 82 MMHG | TEMPERATURE: 98 F | RESPIRATION RATE: 12 BRPM | HEART RATE: 78 BPM | OXYGEN SATURATION: 96 % | SYSTOLIC BLOOD PRESSURE: 130 MMHG

## 2023-05-02 DIAGNOSIS — E78.2 MIXED HYPERLIPIDEMIA: ICD-10-CM

## 2023-05-02 DIAGNOSIS — Z00.00 WELCOME TO MEDICARE PREVENTIVE VISIT: Primary | ICD-10-CM

## 2023-05-02 DIAGNOSIS — Z12.5 PROSTATE CANCER SCREENING: ICD-10-CM

## 2023-05-02 DIAGNOSIS — I10 ESSENTIAL HYPERTENSION WITH GOAL BLOOD PRESSURE LESS THAN 130/80: ICD-10-CM

## 2023-05-02 DIAGNOSIS — R73.01 FASTING HYPERGLYCEMIA: ICD-10-CM

## 2023-05-02 PROCEDURE — G0402 INITIAL PREVENTIVE EXAM: HCPCS | Performed by: INTERNAL MEDICINE

## 2023-05-02 PROCEDURE — G8417 CALC BMI ABV UP PARAM F/U: HCPCS | Performed by: INTERNAL MEDICINE

## 2023-05-02 PROCEDURE — G8536 NO DOC ELDER MAL SCRN: HCPCS | Performed by: INTERNAL MEDICINE

## 2023-05-02 PROCEDURE — G0403 EKG FOR INITIAL PREVENT EXAM: HCPCS | Performed by: INTERNAL MEDICINE

## 2023-05-02 PROCEDURE — G8427 DOCREV CUR MEDS BY ELIG CLIN: HCPCS | Performed by: INTERNAL MEDICINE

## 2023-05-02 PROCEDURE — G8510 SCR DEP NEG, NO PLAN REQD: HCPCS | Performed by: INTERNAL MEDICINE

## 2023-05-02 RX ORDER — LOVASTATIN 20 MG/1
20 TABLET ORAL
Qty: 90 TABLET | Refills: 4 | Status: SHIPPED | OUTPATIENT
Start: 2023-05-02

## 2024-04-26 DIAGNOSIS — Z00.00 ENCOUNTER FOR GENERAL ADULT MEDICAL EXAMINATION WITHOUT ABNORMAL FINDINGS: ICD-10-CM

## 2024-04-26 RX ORDER — LISINOPRIL 10 MG/1
10 TABLET ORAL DAILY
Qty: 90 TABLET | Refills: 4 | Status: SHIPPED | OUTPATIENT
Start: 2024-04-26

## 2024-04-30 SDOH — ECONOMIC STABILITY: FOOD INSECURITY: WITHIN THE PAST 12 MONTHS, THE FOOD YOU BOUGHT JUST DIDN'T LAST AND YOU DIDN'T HAVE MONEY TO GET MORE.: NEVER TRUE

## 2024-04-30 SDOH — HEALTH STABILITY: PHYSICAL HEALTH: ON AVERAGE, HOW MANY DAYS PER WEEK DO YOU ENGAGE IN MODERATE TO STRENUOUS EXERCISE (LIKE A BRISK WALK)?: 3 DAYS

## 2024-04-30 SDOH — ECONOMIC STABILITY: HOUSING INSECURITY
IN THE LAST 12 MONTHS, WAS THERE A TIME WHEN YOU DID NOT HAVE A STEADY PLACE TO SLEEP OR SLEPT IN A SHELTER (INCLUDING NOW)?: NO

## 2024-04-30 SDOH — ECONOMIC STABILITY: INCOME INSECURITY: HOW HARD IS IT FOR YOU TO PAY FOR THE VERY BASICS LIKE FOOD, HOUSING, MEDICAL CARE, AND HEATING?: NOT HARD AT ALL

## 2024-04-30 SDOH — HEALTH STABILITY: PHYSICAL HEALTH: ON AVERAGE, HOW MANY MINUTES DO YOU ENGAGE IN EXERCISE AT THIS LEVEL?: 30 MIN

## 2024-04-30 SDOH — ECONOMIC STABILITY: FOOD INSECURITY: WITHIN THE PAST 12 MONTHS, YOU WORRIED THAT YOUR FOOD WOULD RUN OUT BEFORE YOU GOT MONEY TO BUY MORE.: NEVER TRUE

## 2024-04-30 SDOH — ECONOMIC STABILITY: TRANSPORTATION INSECURITY
IN THE PAST 12 MONTHS, HAS LACK OF TRANSPORTATION KEPT YOU FROM MEETINGS, WORK, OR FROM GETTING THINGS NEEDED FOR DAILY LIVING?: NO

## 2024-04-30 ASSESSMENT — LIFESTYLE VARIABLES
HOW OFTEN DO YOU HAVE SIX OR MORE DRINKS ON ONE OCCASION: 1
HOW OFTEN DO YOU HAVE A DRINK CONTAINING ALCOHOL: 4
HOW MANY STANDARD DRINKS CONTAINING ALCOHOL DO YOU HAVE ON A TYPICAL DAY: 1 OR 2
HOW OFTEN DO YOU HAVE A DRINK CONTAINING ALCOHOL: 2-3 TIMES A WEEK
HOW MANY STANDARD DRINKS CONTAINING ALCOHOL DO YOU HAVE ON A TYPICAL DAY: 1

## 2024-04-30 ASSESSMENT — PATIENT HEALTH QUESTIONNAIRE - PHQ9
SUM OF ALL RESPONSES TO PHQ QUESTIONS 1-9: 0
2. FEELING DOWN, DEPRESSED OR HOPELESS: NOT AT ALL
SUM OF ALL RESPONSES TO PHQ QUESTIONS 1-9: 0
SUM OF ALL RESPONSES TO PHQ QUESTIONS 1-9: 0
1. LITTLE INTEREST OR PLEASURE IN DOING THINGS: NOT AT ALL
SUM OF ALL RESPONSES TO PHQ QUESTIONS 1-9: 0
SUM OF ALL RESPONSES TO PHQ9 QUESTIONS 1 & 2: 0

## 2024-05-02 ENCOUNTER — OFFICE VISIT (OUTPATIENT)
Age: 69
End: 2024-05-02
Payer: MEDICARE

## 2024-05-02 VITALS
HEIGHT: 69 IN | RESPIRATION RATE: 15 BRPM | OXYGEN SATURATION: 96 % | DIASTOLIC BLOOD PRESSURE: 82 MMHG | WEIGHT: 192.6 LBS | SYSTOLIC BLOOD PRESSURE: 136 MMHG | TEMPERATURE: 98.2 F | BODY MASS INDEX: 28.53 KG/M2 | HEART RATE: 72 BPM

## 2024-05-02 DIAGNOSIS — I10 ESSENTIAL (PRIMARY) HYPERTENSION: ICD-10-CM

## 2024-05-02 DIAGNOSIS — E78.2 MIXED HYPERLIPIDEMIA: ICD-10-CM

## 2024-05-02 DIAGNOSIS — Z87.891 PERSONAL HISTORY OF TOBACCO USE, PRESENTING HAZARDS TO HEALTH: ICD-10-CM

## 2024-05-02 DIAGNOSIS — Z00.00 MEDICARE ANNUAL WELLNESS VISIT, SUBSEQUENT: Primary | ICD-10-CM

## 2024-05-02 DIAGNOSIS — Z13.6 SCREENING FOR AAA (ABDOMINAL AORTIC ANEURYSM): ICD-10-CM

## 2024-05-02 DIAGNOSIS — Z12.5 ENCOUNTER FOR SCREENING FOR MALIGNANT NEOPLASM OF PROSTATE: ICD-10-CM

## 2024-05-02 DIAGNOSIS — R73.01 IMPAIRED FASTING GLUCOSE: ICD-10-CM

## 2024-05-02 PROCEDURE — G8419 CALC BMI OUT NRM PARAM NOF/U: HCPCS | Performed by: INTERNAL MEDICINE

## 2024-05-02 PROCEDURE — 3075F SYST BP GE 130 - 139MM HG: CPT | Performed by: INTERNAL MEDICINE

## 2024-05-02 PROCEDURE — 3079F DIAST BP 80-89 MM HG: CPT | Performed by: INTERNAL MEDICINE

## 2024-05-02 PROCEDURE — 1123F ACP DISCUSS/DSCN MKR DOCD: CPT | Performed by: INTERNAL MEDICINE

## 2024-05-02 PROCEDURE — G8427 DOCREV CUR MEDS BY ELIG CLIN: HCPCS | Performed by: INTERNAL MEDICINE

## 2024-05-02 PROCEDURE — 1036F TOBACCO NON-USER: CPT | Performed by: INTERNAL MEDICINE

## 2024-05-02 PROCEDURE — 99213 OFFICE O/P EST LOW 20 MIN: CPT | Performed by: INTERNAL MEDICINE

## 2024-05-02 PROCEDURE — 3017F COLORECTAL CA SCREEN DOC REV: CPT | Performed by: INTERNAL MEDICINE

## 2024-05-02 PROCEDURE — G0439 PPPS, SUBSEQ VISIT: HCPCS | Performed by: INTERNAL MEDICINE

## 2024-05-02 RX ORDER — MULTIVITAMIN WITH IRON
1 TABLET ORAL DAILY
Refills: 0 | COMMUNITY
Start: 2024-05-02

## 2024-05-02 RX ORDER — VITAMIN B COMPLEX
1 CAPSULE ORAL DAILY
Qty: 30 CAPSULE | Refills: 3 | COMMUNITY
Start: 2024-05-02

## 2024-05-02 RX ORDER — ASPIRIN 81 MG/1
81 TABLET ORAL DAILY
COMMUNITY

## 2024-05-02 NOTE — PROGRESS NOTES
Medicare Annual Wellness Visit    Nehemias Hyde is here for Medicare AWV    Assessment & Plan   Medicare annual wellness visit, subsequent  Mixed hyperlipidemia-LDL goal of 100.  Tolerating statins well.  Will check labs for that.  -     Comprehensive Metabolic Panel; Future  -     CBC with Auto Differential; Future  -     Lipid Panel; Future  -     TSH; Future  Impaired fasting glucose-repeat A1c and blood sugar.  -     Hemoglobin A1C; Future  Essential (primary) hypertension-check blood pressure at home but it appears to be well-controlled.  Personal history of tobacco use, presenting hazards to health  -     Vascular AAA screening; Future  Screening for AAA (abdominal aortic aneurysm)  -     Vascular AAA screening; Future  Encounter for screening for malignant neoplasm of prostate  -     PSA Screening; Future    Recommendations for Preventive Services Due: see orders and patient instructions/AVS.  Recommended screening schedule for the next 5-10 years is provided to the patient in written form: see Patient Instructions/AVS.     Return in 1 year (on 5/2/2025).     Subjective   Presents for a wellness exam and a follow-up.  Overall he has been feeling well.  He denies headache or dizziness.  He is exercising regularly.  No chest pains or shortness of breath.  No cough or wheeze.  No change in bowel or bladder habits.    Patient's complete Health Risk Assessment and screening values have been reviewed and are found in Flowsheets. The following problems were reviewed today and where indicated follow up appointments were made and/or referrals ordered.    Positive Risk Factor Screenings with Interventions:                    Safety:  Do you have non-slip mats or non-slip surfaces or shower bars or grab bars in your shower or bathtub?: (!) No  Interventions:  See AVS for additional education material                   Objective   Vitals:    05/02/24 0912 05/02/24 1004   BP: (!) 142/93 136/82   Pulse: 72    Resp: 15

## 2024-05-02 NOTE — PATIENT INSTRUCTIONS
recommended every 1-2 years to screen for glaucoma; cataracts, macular degeneration, and other eye disorders.  A preventive dental visit is recommended every 6 months.  Try to get at least 150 minutes of exercise per week or 10,000 steps per day on a pedometer .  Order or download the FREE \"Exercise & Physical Activity: Your Everyday Guide\" from [employer] [place] [employer]. Call 1-187.785.9603 or search [employer] [place] [employer] online.  You need 7819-9396 mg of calcium and 9835-0963 IU of vitamin D per day. It is possible to meet your calcium requirement with diet alone, but a vitamin D supplement is usually necessary to meet this goal.  When exposed to the sun, use a sunscreen that protects against both UVA and UVB radiation with an SPF of 30 or greater. Reapply every 2 to 3 hours or after sweating, drying off with a towel, or swimming.  Always wear a seat belt when traveling in a car. Always wear a helmet when riding a bicycle or motorcycle.

## 2024-05-03 LAB
ALBUMIN SERPL-MCNC: 4.3 G/DL (ref 3.5–5)
ALBUMIN/GLOB SERPL: 1.4 (ref 1.1–2.2)
ALP SERPL-CCNC: 40 U/L (ref 45–117)
ALT SERPL-CCNC: 33 U/L (ref 12–78)
ANION GAP SERPL CALC-SCNC: 4 MMOL/L (ref 5–15)
AST SERPL-CCNC: 21 U/L (ref 15–37)
BASOPHILS # BLD: 0 K/UL (ref 0–0.1)
BASOPHILS NFR BLD: 1 % (ref 0–1)
BILIRUB SERPL-MCNC: 0.8 MG/DL (ref 0.2–1)
BUN SERPL-MCNC: 23 MG/DL (ref 6–20)
BUN/CREAT SERPL: 20 (ref 12–20)
CALCIUM SERPL-MCNC: 9.8 MG/DL (ref 8.5–10.1)
CHLORIDE SERPL-SCNC: 105 MMOL/L (ref 97–108)
CHOLEST SERPL-MCNC: 150 MG/DL
CO2 SERPL-SCNC: 29 MMOL/L (ref 21–32)
CREAT SERPL-MCNC: 1.17 MG/DL (ref 0.7–1.3)
DIFFERENTIAL METHOD BLD: NORMAL
EOSINOPHIL # BLD: 0.1 K/UL (ref 0–0.4)
EOSINOPHIL NFR BLD: 1 % (ref 0–7)
ERYTHROCYTE [DISTWIDTH] IN BLOOD BY AUTOMATED COUNT: 13.4 % (ref 11.5–14.5)
EST. AVERAGE GLUCOSE BLD GHB EST-MCNC: 126 MG/DL
GLOBULIN SER CALC-MCNC: 3.1 G/DL (ref 2–4)
GLUCOSE SERPL-MCNC: 111 MG/DL (ref 65–100)
HBA1C MFR BLD: 6 % (ref 4–5.6)
HCT VFR BLD AUTO: 46.7 % (ref 36.6–50.3)
HDLC SERPL-MCNC: 50 MG/DL
HDLC SERPL: 3 (ref 0–5)
HGB BLD-MCNC: 15.8 G/DL (ref 12.1–17)
IMM GRANULOCYTES # BLD AUTO: 0 K/UL (ref 0–0.04)
IMM GRANULOCYTES NFR BLD AUTO: 0 % (ref 0–0.5)
LDLC SERPL CALC-MCNC: 84.2 MG/DL (ref 0–100)
LYMPHOCYTES # BLD: 2.1 K/UL (ref 0.8–3.5)
LYMPHOCYTES NFR BLD: 28 % (ref 12–49)
MCH RBC QN AUTO: 30.3 PG (ref 26–34)
MCHC RBC AUTO-ENTMCNC: 33.8 G/DL (ref 30–36.5)
MCV RBC AUTO: 89.6 FL (ref 80–99)
MONOCYTES # BLD: 0.7 K/UL (ref 0–1)
MONOCYTES NFR BLD: 10 % (ref 5–13)
NEUTS SEG # BLD: 4.5 K/UL (ref 1.8–8)
NEUTS SEG NFR BLD: 60 % (ref 32–75)
NRBC # BLD: 0 K/UL (ref 0–0.01)
NRBC BLD-RTO: 0 PER 100 WBC
PLATELET # BLD AUTO: 219 K/UL (ref 150–400)
PMV BLD AUTO: 10 FL (ref 8.9–12.9)
POTASSIUM SERPL-SCNC: 4.8 MMOL/L (ref 3.5–5.1)
PROT SERPL-MCNC: 7.4 G/DL (ref 6.4–8.2)
PSA SERPL-MCNC: 3.6 NG/ML (ref 0.01–4)
RBC # BLD AUTO: 5.21 M/UL (ref 4.1–5.7)
SODIUM SERPL-SCNC: 138 MMOL/L (ref 136–145)
TRIGL SERPL-MCNC: 79 MG/DL
TSH SERPL DL<=0.05 MIU/L-ACNC: 1.3 UIU/ML (ref 0.36–3.74)
VLDLC SERPL CALC-MCNC: 15.8 MG/DL
WBC # BLD AUTO: 7.5 K/UL (ref 4.1–11.1)

## 2024-05-06 DIAGNOSIS — R97.20 INCREASED PROSTATE SPECIFIC ANTIGEN (PSA) VELOCITY: Primary | ICD-10-CM

## 2024-05-10 ENCOUNTER — HOSPITAL ENCOUNTER (OUTPATIENT)
Facility: HOSPITAL | Age: 69
End: 2024-05-10
Attending: INTERNAL MEDICINE
Payer: MEDICARE

## 2024-05-10 DIAGNOSIS — Z13.6 SCREENING FOR AAA (ABDOMINAL AORTIC ANEURYSM): ICD-10-CM

## 2024-05-10 DIAGNOSIS — Z87.891 PERSONAL HISTORY OF TOBACCO USE, PRESENTING HAZARDS TO HEALTH: ICD-10-CM

## 2024-05-10 PROCEDURE — 76706 US ABDL AORTA SCREEN AAA: CPT

## 2024-05-23 ENCOUNTER — PATIENT MESSAGE (OUTPATIENT)
Age: 69
End: 2024-05-23

## 2024-05-23 RX ORDER — LOVASTATIN 20 MG/1
20 TABLET ORAL NIGHTLY
Qty: 90 TABLET | Refills: 1 | Status: SHIPPED | OUTPATIENT
Start: 2024-05-23

## 2024-05-23 NOTE — TELEPHONE ENCOUNTER
From: Nehemias yHde  To: Dr. Eliseo Corey  Sent: 5/23/2024 12:50 PM EDT  Subject: Medication renewal    Dr. Corey and need a renewal on Lovastatin. I have about 3 wks worth. Thanks Angel

## 2024-05-23 NOTE — TELEPHONE ENCOUNTER
No chief complaint on file.    Last Appointment with Dr. Eliseo Corey 5/2/24    Future Appointments   Date Time Provider Department Center   5/7/2025  9:00 AM Eliseo Corey MD [facility]     [facility]

## 2024-08-05 DIAGNOSIS — R97.20 INCREASED PROSTATE SPECIFIC ANTIGEN (PSA) VELOCITY: ICD-10-CM

## 2024-08-05 LAB — PSA SERPL-MCNC: 2.8 NG/ML (ref 0.01–4)

## 2024-12-18 RX ORDER — LOVASTATIN 20 MG/1
20 TABLET ORAL NIGHTLY
Qty: 90 TABLET | Refills: 1 | Status: SHIPPED | OUTPATIENT
Start: 2024-12-18 | End: 2024-12-18

## 2024-12-18 RX ORDER — LOVASTATIN 20 MG/1
20 TABLET ORAL NIGHTLY
Qty: 90 TABLET | Refills: 1 | Status: SHIPPED | OUTPATIENT
Start: 2024-12-18

## 2024-12-18 NOTE — TELEPHONE ENCOUNTER
Chief Complaint   Patient presents with    Medication Refill     Last Appointment with Dr. Eliseo Corey:  5/2/2024   Future Appointments   Date Time Provider Department Center   5/7/2025  9:00 AM Eliseo Corey MD [facility]

## 2025-02-03 ENCOUNTER — PATIENT MESSAGE (OUTPATIENT)
Age: 70
End: 2025-02-03

## 2025-02-03 DIAGNOSIS — R97.20 ELEVATED PROSTATE SPECIFIC ANTIGEN (PSA): Primary | ICD-10-CM

## 2025-02-05 DIAGNOSIS — R97.20 ELEVATED PROSTATE SPECIFIC ANTIGEN (PSA): ICD-10-CM

## 2025-02-05 LAB — PSA SERPL-MCNC: 2.1 NG/ML (ref 0.01–4)

## 2025-05-01 SDOH — HEALTH STABILITY: PHYSICAL HEALTH: ON AVERAGE, HOW MANY MINUTES DO YOU ENGAGE IN EXERCISE AT THIS LEVEL?: 60 MIN

## 2025-05-01 SDOH — HEALTH STABILITY: PHYSICAL HEALTH: ON AVERAGE, HOW MANY DAYS PER WEEK DO YOU ENGAGE IN MODERATE TO STRENUOUS EXERCISE (LIKE A BRISK WALK)?: 3 DAYS

## 2025-05-01 ASSESSMENT — PATIENT HEALTH QUESTIONNAIRE - PHQ9
1. LITTLE INTEREST OR PLEASURE IN DOING THINGS: NOT AT ALL
SUM OF ALL RESPONSES TO PHQ QUESTIONS 1-9: 0
2. FEELING DOWN, DEPRESSED OR HOPELESS: NOT AT ALL

## 2025-05-01 ASSESSMENT — LIFESTYLE VARIABLES
HOW MANY STANDARD DRINKS CONTAINING ALCOHOL DO YOU HAVE ON A TYPICAL DAY: 1
HOW OFTEN DO YOU HAVE A DRINK CONTAINING ALCOHOL: 2-4 TIMES A MONTH
HOW MANY STANDARD DRINKS CONTAINING ALCOHOL DO YOU HAVE ON A TYPICAL DAY: 1 OR 2
HOW OFTEN DO YOU HAVE A DRINK CONTAINING ALCOHOL: 3
HOW OFTEN DO YOU HAVE SIX OR MORE DRINKS ON ONE OCCASION: 1

## 2025-05-04 SDOH — ECONOMIC STABILITY: FOOD INSECURITY: WITHIN THE PAST 12 MONTHS, YOU WORRIED THAT YOUR FOOD WOULD RUN OUT BEFORE YOU GOT MONEY TO BUY MORE.: NEVER TRUE

## 2025-05-04 SDOH — ECONOMIC STABILITY: FOOD INSECURITY: WITHIN THE PAST 12 MONTHS, THE FOOD YOU BOUGHT JUST DIDN'T LAST AND YOU DIDN'T HAVE MONEY TO GET MORE.: NEVER TRUE

## 2025-05-04 SDOH — ECONOMIC STABILITY: INCOME INSECURITY: IN THE LAST 12 MONTHS, WAS THERE A TIME WHEN YOU WERE NOT ABLE TO PAY THE MORTGAGE OR RENT ON TIME?: NO

## 2025-05-04 SDOH — ECONOMIC STABILITY: TRANSPORTATION INSECURITY
IN THE PAST 12 MONTHS, HAS THE LACK OF TRANSPORTATION KEPT YOU FROM MEDICAL APPOINTMENTS OR FROM GETTING MEDICATIONS?: NO

## 2025-05-07 ENCOUNTER — OFFICE VISIT (OUTPATIENT)
Age: 70
End: 2025-05-07
Payer: MEDICARE

## 2025-05-07 VITALS
SYSTOLIC BLOOD PRESSURE: 128 MMHG | DIASTOLIC BLOOD PRESSURE: 78 MMHG | TEMPERATURE: 98.1 F | RESPIRATION RATE: 15 BRPM | OXYGEN SATURATION: 97 % | HEART RATE: 77 BPM | BODY MASS INDEX: 29.27 KG/M2 | WEIGHT: 197.6 LBS | HEIGHT: 69 IN

## 2025-05-07 DIAGNOSIS — Z12.5 PROSTATE CANCER SCREENING: ICD-10-CM

## 2025-05-07 DIAGNOSIS — Z00.00 ENCOUNTER FOR GENERAL ADULT MEDICAL EXAMINATION WITHOUT ABNORMAL FINDINGS: ICD-10-CM

## 2025-05-07 DIAGNOSIS — R73.01 IMPAIRED FASTING GLUCOSE: ICD-10-CM

## 2025-05-07 DIAGNOSIS — R97.20 ELEVATED PROSTATE SPECIFIC ANTIGEN (PSA): ICD-10-CM

## 2025-05-07 DIAGNOSIS — I10 ESSENTIAL (PRIMARY) HYPERTENSION: ICD-10-CM

## 2025-05-07 DIAGNOSIS — Z00.00 MEDICARE ANNUAL WELLNESS VISIT, SUBSEQUENT: Primary | ICD-10-CM

## 2025-05-07 DIAGNOSIS — E78.2 MIXED HYPERLIPIDEMIA: ICD-10-CM

## 2025-05-07 PROCEDURE — 1036F TOBACCO NON-USER: CPT | Performed by: INTERNAL MEDICINE

## 2025-05-07 PROCEDURE — 1159F MED LIST DOCD IN RCRD: CPT | Performed by: INTERNAL MEDICINE

## 2025-05-07 PROCEDURE — G8419 CALC BMI OUT NRM PARAM NOF/U: HCPCS | Performed by: INTERNAL MEDICINE

## 2025-05-07 PROCEDURE — G8427 DOCREV CUR MEDS BY ELIG CLIN: HCPCS | Performed by: INTERNAL MEDICINE

## 2025-05-07 PROCEDURE — 1123F ACP DISCUSS/DSCN MKR DOCD: CPT | Performed by: INTERNAL MEDICINE

## 2025-05-07 PROCEDURE — 3074F SYST BP LT 130 MM HG: CPT | Performed by: INTERNAL MEDICINE

## 2025-05-07 PROCEDURE — 3017F COLORECTAL CA SCREEN DOC REV: CPT | Performed by: INTERNAL MEDICINE

## 2025-05-07 PROCEDURE — 1126F AMNT PAIN NOTED NONE PRSNT: CPT | Performed by: INTERNAL MEDICINE

## 2025-05-07 PROCEDURE — 99214 OFFICE O/P EST MOD 30 MIN: CPT | Performed by: INTERNAL MEDICINE

## 2025-05-07 PROCEDURE — G0439 PPPS, SUBSEQ VISIT: HCPCS | Performed by: INTERNAL MEDICINE

## 2025-05-07 PROCEDURE — 3078F DIAST BP <80 MM HG: CPT | Performed by: INTERNAL MEDICINE

## 2025-05-07 RX ORDER — LOVASTATIN 20 MG/1
20 TABLET ORAL NIGHTLY
Qty: 90 TABLET | Refills: 3 | Status: SHIPPED | OUTPATIENT
Start: 2025-05-07

## 2025-05-07 RX ORDER — LISINOPRIL 10 MG/1
10 TABLET ORAL DAILY
Qty: 90 TABLET | Refills: 3 | Status: SHIPPED | OUTPATIENT
Start: 2025-05-07

## 2025-05-07 NOTE — PROGRESS NOTES
Medicare Annual Wellness Visit    Nehemias Hyde is here for Medicare AWV    Assessment & Plan   Medicare annual wellness visit, subsequent  Impaired fasting glucose-repeat blood sugar and A1c.  Weight is up so I am concerned that these will be.  Continue diet for this.  -     ESTABLISHED, MOD MDM, 30-39 MIN [65224]  -     Hemoglobin A1C; Future  Mixed hyperlipidemia-LDL goal of 100.  Tolerating statins well.  -     ESTABLISHED, MOD MDM, 30-39 MIN [[id number]]  -     Comprehensive Metabolic Panel; Future  -     CBC with Auto Differential; Future  -     Lipid Panel; Future  -     TSH; Future  Elevated prostate specific antigen (PSA)-repeat PSA today.  -     ESTABLISHED, MOD MDM, 30-39 MIN [[id number]]  Essential (primary) hypertension-blood pressure well-controlled on current meds.  Will continue those at home.  -     ESTABLISHED, MOD MDM, 30-39 MIN [[id number]]  Encounter for general adult medical examination without abnormal findings  -     lisinopril (PRINIVIL;ZESTRIL) 10 MG tablet; Take 1 tablet by mouth daily, Disp-90 tablet, R-3Normal  Prostate cancer screening     Return in 1 year (on 5/7/2026) for Medicare AWV.     Subjective   Presents for wellness exam and follow-up.  Has been feeling reasonably well.  Denies headache or dizziness.  No chest pains or shortness of breath.  No cough or wheeze.  No change in bowel or bladder habits.  No unusual muscle aches or joint aches.    Patient's complete Health Risk Assessment and screening values have been reviewed and are found in Flowsheets. The following problems were reviewed today and where indicated follow up appointments were made and/or referrals ordered.    Positive Risk Factor Screenings with Interventions:                   Vision Screen:  Do you have difficulty driving, watching TV, or doing any of your daily activities because of your eyesight?: (Patient-Rptd) No  Have you had an eye exam within the past year?: (!) (Patient-Rptd) No  Interventions:   Patient

## 2025-05-15 DIAGNOSIS — R73.01 IMPAIRED FASTING GLUCOSE: ICD-10-CM

## 2025-05-15 DIAGNOSIS — E78.2 MIXED HYPERLIPIDEMIA: ICD-10-CM

## 2025-05-16 LAB
ALBUMIN SERPL-MCNC: 4 G/DL (ref 3.5–5)
ALBUMIN/GLOB SERPL: 1.3 (ref 1.1–2.2)
ALP SERPL-CCNC: 43 U/L (ref 45–117)
ALT SERPL-CCNC: 35 U/L (ref 12–78)
ANION GAP SERPL CALC-SCNC: 5 MMOL/L (ref 2–12)
AST SERPL-CCNC: 22 U/L (ref 15–37)
BASOPHILS # BLD: 0.04 K/UL (ref 0–0.1)
BASOPHILS NFR BLD: 0.6 % (ref 0–1)
BILIRUB SERPL-MCNC: 0.7 MG/DL (ref 0.2–1)
BUN SERPL-MCNC: 19 MG/DL (ref 6–20)
BUN/CREAT SERPL: 17 (ref 12–20)
CALCIUM SERPL-MCNC: 9.5 MG/DL (ref 8.5–10.1)
CHLORIDE SERPL-SCNC: 105 MMOL/L (ref 97–108)
CHOLEST SERPL-MCNC: 152 MG/DL
CO2 SERPL-SCNC: 27 MMOL/L (ref 21–32)
CREAT SERPL-MCNC: 1.13 MG/DL (ref 0.7–1.3)
DIFFERENTIAL METHOD BLD: NORMAL
EOSINOPHIL # BLD: 0.1 K/UL (ref 0–0.4)
EOSINOPHIL NFR BLD: 1.5 % (ref 0–7)
ERYTHROCYTE [DISTWIDTH] IN BLOOD BY AUTOMATED COUNT: 13.3 % (ref 11.5–14.5)
EST. AVERAGE GLUCOSE BLD GHB EST-MCNC: 137 MG/DL
GLOBULIN SER CALC-MCNC: 3 G/DL (ref 2–4)
GLUCOSE SERPL-MCNC: 112 MG/DL (ref 65–100)
HBA1C MFR BLD: 6.4 % (ref 4–5.6)
HCT VFR BLD AUTO: 46 % (ref 36.6–50.3)
HDLC SERPL-MCNC: 44 MG/DL
HDLC SERPL: 3.5 (ref 0–5)
HGB BLD-MCNC: 15.7 G/DL (ref 12.1–17)
IMM GRANULOCYTES # BLD AUTO: 0.02 K/UL (ref 0–0.04)
IMM GRANULOCYTES NFR BLD AUTO: 0.3 % (ref 0–0.5)
LDLC SERPL CALC-MCNC: 84.2 MG/DL (ref 0–100)
LYMPHOCYTES # BLD: 2.15 K/UL (ref 0.8–3.5)
LYMPHOCYTES NFR BLD: 31.9 % (ref 12–49)
MCH RBC QN AUTO: 30.4 PG (ref 26–34)
MCHC RBC AUTO-ENTMCNC: 34.1 G/DL (ref 30–36.5)
MCV RBC AUTO: 89 FL (ref 80–99)
MONOCYTES # BLD: 0.74 K/UL (ref 0–1)
MONOCYTES NFR BLD: 11 % (ref 5–13)
NEUTS SEG # BLD: 3.7 K/UL (ref 1.8–8)
NEUTS SEG NFR BLD: 54.7 % (ref 32–75)
NRBC # BLD: 0 K/UL (ref 0–0.01)
NRBC BLD-RTO: 0 PER 100 WBC
PLATELET # BLD AUTO: 216 K/UL (ref 150–400)
PMV BLD AUTO: 10.2 FL (ref 8.9–12.9)
POTASSIUM SERPL-SCNC: 4.2 MMOL/L (ref 3.5–5.1)
PROT SERPL-MCNC: 7 G/DL (ref 6.4–8.2)
RBC # BLD AUTO: 5.17 M/UL (ref 4.1–5.7)
SODIUM SERPL-SCNC: 137 MMOL/L (ref 136–145)
TRIGL SERPL-MCNC: 119 MG/DL
TSH SERPL DL<=0.05 MIU/L-ACNC: 2.01 UIU/ML (ref 0.36–3.74)
VLDLC SERPL CALC-MCNC: 23.8 MG/DL
WBC # BLD AUTO: 6.8 K/UL (ref 4.1–11.1)

## 2025-05-18 ENCOUNTER — RESULTS FOLLOW-UP (OUTPATIENT)
Age: 70
End: 2025-05-18

## 2025-08-12 ENCOUNTER — E-VISIT (OUTPATIENT)
Age: 70
End: 2025-08-12
Payer: MEDICARE

## 2025-08-12 DIAGNOSIS — U07.1 COVID-19: Primary | ICD-10-CM

## 2025-08-12 PROCEDURE — 99421 OL DIG E/M SVC 5-10 MIN: CPT | Performed by: INTERNAL MEDICINE

## 2025-08-12 ASSESSMENT — LIFESTYLE VARIABLES
PACKS_PER_DAY: 1
SMOKING_STATUS: NO, BUT I USED TO SMOKE
SMOKING_YEARS: 2